# Patient Record
Sex: FEMALE | Race: WHITE
[De-identification: names, ages, dates, MRNs, and addresses within clinical notes are randomized per-mention and may not be internally consistent; named-entity substitution may affect disease eponyms.]

---

## 2017-08-14 ENCOUNTER — HOSPITAL ENCOUNTER (OUTPATIENT)
Dept: HOSPITAL 31 - C.ER | Age: 82
Setting detail: OBSERVATION
LOS: 1 days | Discharge: HOME | End: 2017-08-15
Attending: INTERNAL MEDICINE | Admitting: INTERNAL MEDICINE
Payer: MEDICARE

## 2017-08-14 VITALS — RESPIRATION RATE: 20 BRPM

## 2017-08-14 VITALS — BODY MASS INDEX: 30.9 KG/M2

## 2017-08-14 DIAGNOSIS — I10: ICD-10-CM

## 2017-08-14 DIAGNOSIS — J20.9: Primary | ICD-10-CM

## 2017-08-14 DIAGNOSIS — E78.00: ICD-10-CM

## 2017-08-14 LAB
ALBUMIN/GLOB SERPL: 1.1 {RATIO} (ref 1–2.1)
ALP SERPL-CCNC: 77 U/L (ref 38–126)
ALT SERPL-CCNC: 29 U/L (ref 9–52)
AST SERPL-CCNC: 25 U/L (ref 14–36)
BASOPHILS # BLD AUTO: 0 K/UL (ref 0–0.2)
BASOPHILS NFR BLD: 0.5 % (ref 0–2)
BILIRUB SERPL-MCNC: 0.8 MG/DL (ref 0.2–1.3)
BUN SERPL-MCNC: 12 MG/DL (ref 7–17)
CALCIUM SERPL-MCNC: 9.4 MG/DL (ref 8.6–10.4)
CHLORIDE SERPL-SCNC: 101 MMOL/L (ref 98–107)
CO2 SERPL-SCNC: 27 MMOL/L (ref 22–30)
EOSINOPHIL # BLD AUTO: 0.1 K/UL (ref 0–0.7)
EOSINOPHIL NFR BLD: 1.2 % (ref 0–4)
ERYTHROCYTE [DISTWIDTH] IN BLOOD BY AUTOMATED COUNT: 14.1 % (ref 11.5–14.5)
GLOBULIN SER-MCNC: 3.6 GM/DL (ref 2.2–3.9)
GLUCOSE SERPL-MCNC: 118 MG/DL (ref 65–105)
HCT VFR BLD CALC: 37.7 % (ref 34–47)
LYMPHOCYTES # BLD AUTO: 2.1 K/UL (ref 1–4.3)
LYMPHOCYTES NFR BLD AUTO: 24.5 % (ref 20–40)
MCH RBC QN AUTO: 26.4 PG (ref 27–31)
MCHC RBC AUTO-ENTMCNC: 31.9 G/DL (ref 33–37)
MCV RBC AUTO: 82.7 FL (ref 81–99)
MONOCYTES # BLD: 0.7 K/UL (ref 0–0.8)
MONOCYTES NFR BLD: 8.6 % (ref 0–10)
NRBC BLD AUTO-RTO: 0 % (ref 0–2)
PLATELET # BLD: 161 K/UL (ref 130–400)
PMV BLD AUTO: 7.8 FL (ref 7.2–11.7)
POTASSIUM SERPL-SCNC: 4.3 MMOL/L (ref 3.6–5.2)
PROT SERPL-MCNC: 7.8 G/DL (ref 6.3–8.3)
SODIUM SERPL-SCNC: 139 MMOL/L (ref 132–148)
WBC # BLD AUTO: 8.6 K/UL (ref 4.8–10.8)

## 2017-08-14 PROCEDURE — 94640 AIRWAY INHALATION TREATMENT: CPT

## 2017-08-14 PROCEDURE — 96368 THER/DIAG CONCURRENT INF: CPT

## 2017-08-14 PROCEDURE — 96372 THER/PROPH/DIAG INJ SC/IM: CPT

## 2017-08-14 PROCEDURE — 96365 THER/PROPH/DIAG IV INF INIT: CPT

## 2017-08-14 PROCEDURE — 87040 BLOOD CULTURE FOR BACTERIA: CPT

## 2017-08-14 PROCEDURE — 99285 EMERGENCY DEPT VISIT HI MDM: CPT

## 2017-08-14 PROCEDURE — 85025 COMPLETE CBC W/AUTO DIFF WBC: CPT

## 2017-08-14 PROCEDURE — 96367 TX/PROPH/DG ADDL SEQ IV INF: CPT

## 2017-08-14 PROCEDURE — 80053 COMPREHEN METABOLIC PANEL: CPT

## 2017-08-14 PROCEDURE — 97116 GAIT TRAINING THERAPY: CPT

## 2017-08-14 PROCEDURE — 96366 THER/PROPH/DIAG IV INF ADDON: CPT

## 2017-08-14 PROCEDURE — 71020: CPT

## 2017-08-14 PROCEDURE — 97162 PT EVAL MOD COMPLEX 30 MIN: CPT

## 2017-08-14 RX ADMIN — IPRATROPIUM BROMIDE AND ALBUTEROL SULFATE SCH ML: .5; 3 SOLUTION RESPIRATORY (INHALATION) at 14:38

## 2017-08-14 RX ADMIN — IPRATROPIUM BROMIDE AND ALBUTEROL SULFATE SCH ML: .5; 3 SOLUTION RESPIRATORY (INHALATION) at 21:24

## 2017-08-14 RX ADMIN — CLOTRIMAZOLE SCH APPLIC: 1 CREAM TOPICAL at 22:12

## 2017-08-14 RX ADMIN — CEFTRIAXONE SCH MLS/HR: 1 INJECTION, SOLUTION INTRAVENOUS at 12:00

## 2017-08-14 NOTE — CP.PCM.HP
History of Present Illness





- History of Present Illness


History of Present Illness: 





83 Y/O WITH COUGH FEVER, CI=HILL[place] FOR 3 DAYS, WEAK, SHORT OF BREATH, NO CHEST 

PAIN, NO NAUSEA





Present on Admission





- Present on Admission


Any Indicators Present on Admission: No


History of DVT/PE: No


History of Uncontrolled Diabetes: No


Urinary Catheter: No


Decubitus Ulcer Present: No





Review of Systems





- Review of Systems


Systems not reviewed;Unavailable: Respiratory Distress





- Constitutional


Constitutional: Chills





- Cardiovascular


Cardiovascular: Palpitations





- Respiratory


Respiratory: Cough, Dyspnea, Chest Congestion, Pain with Coughing





- Neurological


Neurological: Weakness





Past Patient History





- Past Medical History & Family History


Past Medical History?: Yes





- Past Social History


Smoking Status: Never Smoked





- CARDIAC


Hx Hypercholesterolemia: Yes


Hx Hypertension: Yes





- PULMONARY


Hx Asthma: Yes





- NEUROLOGICAL


Hx Neurological Disorder: No





- HEENT


Hx HEENT Problems: No





- RENAL


Hx Chronic Kidney Disease: No





- ENDOCRINE/METABOLIC


Hx Endocrine Disorders: No





- HEMATOLOGICAL/ONCOLOGICAL


Hx Blood Disorders: No





- INTEGUMENTARY


Hx Dermatological Problems: No





- MUSCULOSKELETAL/RHEUMATOLOGICAL


Hx Arthritis: Yes


Hx Falls: Yes (2 years ago, was wearing high heels)





- GASTROINTESTINAL


Hx Gastrointestinal Disorders: No





- GENITOURINARY/GYNECOLOGICAL


Hx Genitourinary Disorders: No





- PSYCHIATRIC


Hx Substance Use: No





- SURGICAL HISTORY


Hx Surgeries: Yes


Other/Comment: BREAST REDUCTION 33 YRS AGO.  MYOMECTOMY 43 YRS AGO.  Carpal 

tunnel Sx 2 years ago





- ANESTHESIA


Hx Anesthesia: Yes


Hx Anesthesia Reactions: No


Hx Malignant Hyperthermia: No


Has any member of the family had a problem w/ anesthesia?: No





Meds


Allergies/Adverse Reactions: 


 Allergies











Allergy/AdvReac Type Severity Reaction Status Date / Time


 


No Known Allergies Allergy   Verified 02/06/14 10:14














Physical Exam





- Constitutional


Appears: No Acute Distress





- Head Exam


Head Exam: ATRAUMATIC, NORMAL INSPECTION, NORMOCEPHALIC





- Eye Exam


Eye Exam: EOMI, Normal appearance, PERRL


Pupil Exam: NORMAL ACCOMODATION





- ENT Exam


ENT Exam: Mucous Membranes Moist, Normal Exam, Normal Oropharynx, TM's Normal 

Bilaterally





- Neck Exam


Neck exam: Positive for: Normal Inspection





- Respiratory Exam


Respiratory Exam: Rhonchi, NORMAL BREATHING PATTERN





- Cardiovascular Exam


Cardiovascular Exam: REGULAR RHYTHM, +S1, +S2





- GI/Abdominal Exam


GI & Abdominal Exam: Normal Bowel Sounds





- Rectal Exam


Rectal Exam: NORMAL INSPECTION





- Extremities Exam


Extremities exam: Positive for: normal inspection





- Back Exam


Back exam: NORMAL INSPECTION





- Neurological Exam


Neurological exam: Alert, CN II-XII Intact, Normal Gait, Oriented x3, Reflexes 

Normal





- Psychiatric Exam


Psychiatric exam: Normal Affect, Normal Mood





- Skin


Skin Exam: Intact





Results





- Vital Signs


Recent Vital Signs: 





 Last Vital Signs











Temp  98.3 F   08/14/17 17:13


 


Pulse  68   08/14/17 21:25


 


Resp  20   08/14/17 17:13


 


BP  152/73 H  08/14/17 17:13


 


Pulse Ox  98   08/14/17 17:13














- Labs


Result Diagrams: 


 08/14/17 09:58





 08/14/17 09:58


Labs: 





 Laboratory Results - last 24 hr











  08/14/17 08/14/17





  09:58 09:58


 


WBC  8.6 


 


RBC  4.55 


 


Hgb  12.0 


 


Hct  37.7 


 


MCV  82.7 


 


MCH  26.4 L 


 


MCHC  31.9 L 


 


RDW  14.1 


 


Plt Count  161 


 


MPV  7.8 


 


Neut % (Auto)  65.2 


 


Lymph % (Auto)  24.5 


 


Mono % (Auto)  8.6 


 


Eos % (Auto)  1.2 


 


Baso % (Auto)  0.5 


 


Neut #  5.6 


 


Lymph #  2.1 


 


Mono #  0.7 


 


Eos #  0.1 


 


Baso #  0.0 


 


Sodium   139


 


Potassium   4.3


 


Chloride   101


 


Carbon Dioxide   27


 


Anion Gap   15


 


BUN   12


 


Creatinine   0.8


 


Est GFR ( Amer)   > 60


 


Est GFR (Non-Af Amer)   > 60


 


Random Glucose   118 H


 


Calcium   9.4


 


Total Bilirubin   0.8


 


AST   25


 


ALT   29


 


Alkaline Phosphatase   77


 


Total Protein   7.8


 


Albumin   4.1


 


Globulin   3.6


 


Albumin/Globulin Ratio   1.1














Assessment & Plan


(1) Tracheobronchitis


Assessment and Plan: 


BACTERILA, R/O PNA


Status: Acute   Priority: High   





(2) Hypertension


Status: Chronic   Priority: Medium   





(3) Hyperlipidemia


Status: Chronic   Priority: Low

## 2017-08-14 NOTE — RAD
HISTORY:

cough  



COMPARISON:

No prior.



TECHNIQUE:

Chest PA and lateral



FINDINGS:

Undo 



LUNGS:

Minor localized linear scarring associated with some minor tenting 

hemidiaphragm lung base. .  There may also be some minor bibasilar 

atelectasis 



PLEURA:

No significant pleural effusion identified. No pneumothorax apparent.



CARDIOVASCULAR:

Normal.



OSSEOUS STRUCTURES:

Mild -moderate multilevel degenerative spondylosis of the thoracic 

spine. .



VISUALIZED UPPER ABDOMEN:

Normal.



OTHER FINDINGS:

None.



IMPRESSION:

Minor localized linear scarring associated with some minor tenting 

hemidiaphragm lung base. .  There may also be some minor bibasilar 

atelectasis

## 2017-08-14 NOTE — C.PDOC
History Of Present Illness


83 y/o female presents to ED with complaints of cough for 2-3 days and fever 

for 2 days, no fever today. Patient states she cannot sleep and has associated 

sob.  is at ED with similar symptoms that developed before her. Patient 

denies chest pain, nausea, vomiting, diarrhea or any other complaints at this 

time. 


Time Seen by Provider: 17 07:22


Chief Complaint (Nursing): Cough, Cold, Congestion


History Per: Patient


History/Exam Limitations: no limitations


Onset/Duration Of Symptoms: Days


Current Symptoms Are (Timing): Still Present





Past Medical History


Reviewed: Historical Data, Nursing Documentation, Vital Signs


Vital Signs: 


 Last Vital Signs











Temp  98.0 F   17 14:27


 


Pulse  77   17 14:27


 


Resp  14   17 14:27


 


BP  165/77 H  17 14:27


 


Pulse Ox  100   17 15:24














- Medical History


PMH: Arthritis, Asthma, Back Problems, HTN, Hypercholesterolemia





- [facility] Procedures








CARPAL TUNNEL RELEASE (02/06/15)








Family History: States: No Known Family Hx





- Social History


Hx Tobacco Use: No


Hx Alcohol Use: No


Hx Substance Use: No





- Immunization History


Hx Tetanus Toxoid Vaccination: No


Hx Influenza Vaccination: Yes


Hx Pneumococcal Vaccination: Yes





Review Of Systems


Except As Marked, All Systems Reviewed And Found Negative.


Constitutional: Positive for: Fever.  Negative for: Chills


Cardiovascular: Negative for: Chest Pain, Palpitations


Respiratory: Positive for: Cough, Shortness of Breath


Gastrointestinal: Negative for: Nausea, Vomiting, Diarrhea


Genitourinary: Negative for: Dysuria


Skin: Negative for: Rash





Physical Exam





- Physical Exam


Appears: Non-toxic, No Acute Distress


Skin: Normal Color, Warm, Dry, No Rash


Head: Atraumatic, Normacephalic


Oral Mucosa: Moist


Neck: Supple


Chest: Symmetrical


Cardiovascular: Rhythm Regular


Respiratory: Decreased Breath Sounds, No Rales, No Rhonchi, No Wheezing


Gastrointestinal/Abdominal: Soft, No Tenderness, No Guarding, No Rebound


Extremity: Normal ROM, Capillary Refill (<2 seconds)


Neurological/Psych: Oriented x3





ED Course And Treatment





- Laboratory Results


Result Diagrams: 


 17 09:58





 17 09:58


ECG: Interpreted By Me, Viewed By Me


ECG Interpretation: No Acute Changes


Rate From EC (BPM)


O2 Sat by Pulse Oximetry: 100 (RA)


Pulse Ox Interpretation: Normal





Medical Decision Making


Medical Decision Making: 


 Patient's  was diagnosed with Pneumonia and is being admitted. Patient  

doesn't have an obvious infiltrate on CXR, but will start Rocephin and 

Zithromax. Patient also will stay in the hospital for observation for clinic 

presentation of pneumonia, advanced age and sick contact with pneumonia  

patient.


Spoke with Dr. Oneill who accepted patient to his service for observation. 





Disposition





- Disposition


Disposition: HOSPITALIZED


Disposition Time: 09:44


Condition: FAIR





- Clinical Impression


Clinical Impression: 


 Cough, Dyspnea








- Scribe Statement


The provider has reviewed the documentation as recorded by the Scribe


Rico Alvarez





All medical record entries made by the Princessibe were at my direction and 

personally dictated by me. I have reviewed the chart and agree that the record 

accurately reflects my personal performance of the history, physical exam, 

medical decision making, and the department course for this patient. I have 

also personally directed, reviewed, and agree with the discharge instructions 

and disposition.





Decision To Admit





- Pt Status Changed To:


Hospital Disposition Of: Observation





- .


Bed Request Type: Regular


Admitting Physician: Elver Oneill


Patient Diagnosis: 


 Cough, Dyspnea

## 2017-08-15 VITALS — SYSTOLIC BLOOD PRESSURE: 135 MMHG | DIASTOLIC BLOOD PRESSURE: 75 MMHG

## 2017-08-15 VITALS — TEMPERATURE: 98.1 F

## 2017-08-15 VITALS — HEART RATE: 72 BPM | OXYGEN SATURATION: 98 %

## 2017-08-15 RX ADMIN — CEFTRIAXONE SCH MLS/HR: 1 INJECTION, SOLUTION INTRAVENOUS at 09:15

## 2017-08-15 RX ADMIN — IPRATROPIUM BROMIDE AND ALBUTEROL SULFATE SCH ML: .5; 3 SOLUTION RESPIRATORY (INHALATION) at 01:51

## 2017-08-15 RX ADMIN — IPRATROPIUM BROMIDE AND ALBUTEROL SULFATE SCH ML: .5; 3 SOLUTION RESPIRATORY (INHALATION) at 08:26

## 2017-08-15 RX ADMIN — CLOTRIMAZOLE SCH APPLIC: 1 CREAM TOPICAL at 09:31

## 2017-08-15 NOTE — CP.PCM.DIS
Provider





- Provider


Date of Admission: 


08/14/17 09:43





Attending physician: 


Elver Oneill MD





Time Spent in preparation of Discharge (in minutes): 30





Diagnosis





- Discharge Diagnosis


(1) Tracheobronchitis


Status: Acute   Priority: High   





(2) Hypertension


Status: Chronic   Priority: Medium   





(3) Hyperlipidemia


Status: Chronic   Priority: Low   





Hospital Course





- Lab Results


Lab Results: 


 Micro Results





08/14/17 10:30   Blood   Blood Culture - Preliminary


                            NO GROWTH AFTER 24 HOURS


08/14/17 09:45   Blood   Blood Culture - Preliminary


                            NO GROWTH AFTER 24 HOURS





 Most Recent Lab Values











WBC  8.6 K/uL (4.8-10.8)   08/14/17  09:58    


 


RBC  4.55 Mil/uL (3.80-5.20)   08/14/17  09:58    


 


Hgb  12.0 g/dL (11.0-16.0)   08/14/17  09:58    


 


Hct  37.7 % (34.0-47.0)   08/14/17  09:58    


 


MCV  82.7 fL (81.0-99.0)   08/14/17  09:58    


 


MCH  26.4 pg (27.0-31.0)  L  08/14/17  09:58    


 


MCHC  31.9 g/dL (33.0-37.0)  L  08/14/17  09:58    


 


RDW  14.1 % (11.5-14.5)   08/14/17  09:58    


 


Plt Count  161 K/uL (130-400)   08/14/17  09:58    


 


MPV  7.8 fL (7.2-11.7)   08/14/17  09:58    


 


Neut % (Auto)  65.2 % (50.0-75.0)   08/14/17  09:58    


 


Lymph % (Auto)  24.5 % (20.0-40.0)   08/14/17  09:58    


 


Mono % (Auto)  8.6 % (0.0-10.0)   08/14/17  09:58    


 


Eos % (Auto)  1.2 % (0.0-4.0)   08/14/17  09:58    


 


Baso % (Auto)  0.5 % (0.0-2.0)   08/14/17  09:58    


 


Neut #  5.6 K/uL (1.8-7.0)   08/14/17  09:58    


 


Lymph #  2.1 K/uL (1.0-4.3)   08/14/17  09:58    


 


Mono #  0.7 K/uL (0.0-0.8)   08/14/17  09:58    


 


Eos #  0.1 K/uL (0.0-0.7)   08/14/17  09:58    


 


Baso #  0.0 K/uL (0.0-0.2)   08/14/17  09:58    


 


Sodium  139 mmol/L (132-148)   08/14/17  09:58    


 


Potassium  4.3 mmol/L (3.6-5.2)   08/14/17  09:58    


 


Chloride  101 mmol/L ()   08/14/17  09:58    


 


Carbon Dioxide  27 mmol/L (22-30)   08/14/17  09:58    


 


Anion Gap  15  (10-20)   08/14/17  09:58    


 


BUN  12 mg/dL (7-17)   08/14/17  09:58    


 


Creatinine  0.8 MG/DL (0.7-1.2)   08/14/17  09:58    


 


Est GFR ( Amer)  > 60   08/14/17  09:58    


 


Est GFR (Non-Af Amer)  > 60   08/14/17  09:58    


 


Random Glucose  118 mg/dL ()  H  08/14/17  09:58    


 


Calcium  9.4 mg/dl (8.6-10.4)   08/14/17  09:58    


 


Total Bilirubin  0.8 mg/dL (0.2-1.3)   08/14/17  09:58    


 


AST  25 U/L (14-36)   08/14/17  09:58    


 


ALT  29 U/L (9-52)   08/14/17  09:58    


 


Alkaline Phosphatase  77 U/L ()   08/14/17  09:58    


 


Total Protein  7.8 g/dL (6.3-8.3)   08/14/17  09:58    


 


Albumin  4.1 g/dL (3.5-5.0)   08/14/17  09:58    


 


Globulin  3.6 gm/dL (2.2-3.9)   08/14/17  09:58    


 


Albumin/Globulin Ratio  1.1  (1.0-2.1)   08/14/17  09:58    














- Hospital Course


Hospital Course: 





81 y/o with cough congestion and wheezing, improved with antibiotics and she is 

for discharge





Discharge Exam





- Head Exam


Head Exam: ATRAUMATIC, NORMAL INSPECTION, NORMOCEPHALIC





- Eye Exam


Eye Exam: EOMI, Normal appearance, PERRL


Pupil Exam: NORMAL ACCOMODATION





- ENT Exam


ENT Exam: Mucous Membranes Moist, Normal Exam, Normal Oropharynx, TM's Normal 

Bilaterally





- Neck Exam


Neck exam: Normal Inspection





- Respiratory Exam


Respiratory Exam: NORMAL BREATHING PATTERN





- Cardiovascular Exam


Cardiovascular Exam: REGULAR RHYTHM, +S1, +S2





- GI/Abdominal Exam


GI & Abdominal Exam: Normal Bowel Sounds





- Rectal Exam


Rectal Exam: NORMAL INSPECTION





- Neurological Exam


Neurological exam: Alert, CN II-XII Intact, Normal Gait, Oriented x3, Reflexes 

Normal





- Psychiatric Exam


Psychiatric exam: Normal Affect, Normal Mood





- Skin


Skin Exam: Intact





Discharge Plan





- Discharge Medications


Prescriptions: 


Amoxicillin [Amoxil 500 mg Cap] 500 mg PO Q8 #15 cap


guaiFENesin [guaifENESIN] 100 mg PO Q8 PRN #120 udc


 PRN Reason: Cough And Congestion





- Follow Up Plan


Condition: FAIR


Disposition: HOME/ ROUTINE


Instructions:  Guaifenesin (By mouth), Amoxicillin/Clavulanate Potassium (By 

mouth), Dyspnea (GEN), Acute Cough (GEN), How Your Lungs Work (DC)


Additional Instructions: 


f/u with PMD in 1 week 


Continue medication as per Med. Rec.


Referrals: 


Elver Oneill MD [Staff Provider] -

## 2017-08-18 NOTE — CARD
--------------- APPROVED REPORT --------------





EKG Measurement

Heart [id number]

MS 142P-8

[id number]

[id number]

[id number]



<Conclusion>

Normal sinus rhythm

Normal ECG

## 2018-04-05 ENCOUNTER — HOSPITAL ENCOUNTER (EMERGENCY)
Dept: HOSPITAL 31 - C.ER | Age: 83
Discharge: HOME | End: 2018-04-05
Payer: MEDICARE

## 2018-04-05 VITALS
SYSTOLIC BLOOD PRESSURE: 180 MMHG | RESPIRATION RATE: 18 BRPM | DIASTOLIC BLOOD PRESSURE: 81 MMHG | OXYGEN SATURATION: 98 % | TEMPERATURE: 97.9 F | HEART RATE: 68 BPM

## 2018-04-05 VITALS — BODY MASS INDEX: 30.9 KG/M2

## 2018-04-05 DIAGNOSIS — K62.89: Primary | ICD-10-CM

## 2018-04-05 NOTE — C.PDOC
History Of Present Illness


Pt c/o anal/rectal pain. 


Time Seen by Provider: 04/05/18 14:04


Chief Complaint (Nursing): GI Problem


History Per: Patient


Onset/Duration Of Symptoms: Days, Waxing/Waning


Current Symptoms Are (Timing): Still Present


Severity: Moderate


Location Of Pain/Discomfort: Other (rectum/anal)


Quality Of Discomfort: Burning, "Pain"


Additional History Per: Prior Records


Abnormal Vaginal Bleeding: No





Past Medical History


Reviewed: Historical Data, Nursing Documentation, Vital Signs


Vital Signs: 





 Last Vital Signs











Temp  97.9 F   04/05/18 13:50


 


Pulse  68   04/05/18 13:50


 


Resp  18   04/05/18 13:50


 


BP  180/81 H  04/05/18 13:50


 


Pulse Ox  98   04/05/18 13:50














- Medical History


PMH: Arthritis (SH, KNEES), Asthma, Back Problems, COPD (ASTHMA), HTN, 

Hypercholesterolemia





- [facility] Procedures











CARPAL TUNNEL RELEASE (02/06/15)








Family History: States: Unknown Family Hx





- Social History


Hx Tobacco Use: No


Hx Alcohol Use: No


Hx Substance Use: No





- Immunization History


Hx Tetanus Toxoid Vaccination: No


Hx Influenza Vaccination: Yes


Hx Pneumococcal Vaccination: Yes





Review Of Systems


Except As Marked, All Systems Reviewed And Found Negative.


Constitutional: Negative for: Fever


Cardiovascular: Negative for: Chest Pain


Respiratory: Negative for: Shortness of Breath


Gastrointestinal: Positive for: Rectal Pain.  Negative for: Vomiting, Abdominal 

Pain, Melena, Hematochezia, Hematemesis


Genitourinary: Negative for: Dysuria, Vaginal Bleeding


Musculoskeletal: Negative for: Neck Pain, Back Pain


Skin: Negative for: Rash


Neurological: Negative for: Weakness, Numbness





Physical Exam





- Physical Exam


Appears: Non-toxic, No Acute Distress


Skin: Normal Color, Warm, Dry, No Rash


Head: Atraumatic, Normacephalic


Eye(s): bilateral: Normal Inspection, PERRL, EOMI


Neck: Normal ROM, Supple


Cardiovascular: Rhythm Regular


Respiratory: Normal Breath Sounds, No Accessory Muscle Use


Gastrointestinal/Abdominal: Soft, No Tenderness


Rectal: Rectal Tone (wnl), Hemorrhoids, No Mass, Tenderness, Other (Brown stool)


Back: No CVA Tenderness


Extremity: Normal ROM


Neurological/Psych: Oriented x3, Normal Motor, Normal Sensation





ED Course And Treatment


O2 Sat by Pulse Oximetry: 98


Pulse Ox Interpretation: Normal





Disposition


Counseled Patient/Family Regarding: Diagnosis, Need For Followup, Rx Given





- Disposition


Referrals: 


Ritchie Araujo MD [Non-Staff] - 


Disposition: HOME/ ROUTINE


Disposition Time: 14:28


Condition: STABLE


Additional Instructions: 


Follow up with your doctor for further evaluation and treatment. Return to the 

ER if you develop fever, abdominal pain, vomiting, bloody stools, worsening of 

symptoms or if you have any other concerns. 


Prescriptions: 


Docusate [Colace] 100 mg PO BID PRN #60 cap


 PRN Reason: Constipation


Phenylephrine [Amaya-Med NF] 1 supp RC QID PRN #30 sup


 PRN Reason: Hemorrhoids


Instructions:  Hemorrhoids (DC)


Forms:  [employer] (Uzbek)


Print Language: Senegalese





- Clinical Impression


Clinical Impression: 


 Rectal or anal pain

## 2018-08-12 ENCOUNTER — HOSPITAL ENCOUNTER (EMERGENCY)
Dept: HOSPITAL 31 - C.ER | Age: 83
Discharge: HOME | End: 2018-08-12
Payer: MEDICARE

## 2018-08-12 VITALS
SYSTOLIC BLOOD PRESSURE: 168 MMHG | TEMPERATURE: 97.9 F | RESPIRATION RATE: 19 BRPM | HEART RATE: 62 BPM | DIASTOLIC BLOOD PRESSURE: 84 MMHG

## 2018-08-12 VITALS — OXYGEN SATURATION: 99 %

## 2018-08-12 VITALS — BODY MASS INDEX: 31.7 KG/M2

## 2018-08-12 DIAGNOSIS — I10: ICD-10-CM

## 2018-08-12 DIAGNOSIS — M79.661: Primary | ICD-10-CM

## 2018-08-12 DIAGNOSIS — E78.00: ICD-10-CM

## 2018-08-12 NOTE — RAD
Date of service: 



08/12/2018



PROCEDURE:  Radiographs of the right tibia and fibula.



HISTORY:

pain



COMPARISON:

None available



TECHNIQUE:

Frontal and lateral views obtained.



FINDINGS:



BONES:

No fracture or destructive lesion. Ossicle within the distal patellar 

tendon in the region of the tibial tuberosity.



JOINT SPACES:

Unremarkable.



OTHER FINDINGS:

None.



IMPRESSION:

Unremarkable radiographs of the right tibia and fibula.

## 2018-08-12 NOTE — C.PDOC
History Of Present Illness


83-year-old female, with PMH arthritis and back pain presents to the emergency 

department with complaints of right lower leg pain, ongoing for the past month. 

Pain worsens with ambulating, and improves with rest. Patient denies any 

changes in sensation, trauma, swelling, change in temperature or any other 

associated symptoms. Patient has not seen PMD for this pain. 


Time Seen by Provider: 08/12/18 14:33


Chief Complaint (Nursing): Lower Extremity Problem/Injury


History Per: Patient


History/Exam Limitations: no limitations





Past Medical History


Reviewed: Historical Data, Nursing Documentation, Vital Signs


Vital Signs: 


 Last Vital Signs











Temp  97.9 F   08/12/18 16:48


 


Pulse  62   08/12/18 16:48


 


Resp  19   08/12/18 16:48


 


BP  168/84 H  08/12/18 16:48


 


Pulse Ox  99   08/12/18 18:55














- Medical History


PMH: Arthritis (SH, KNEES), Asthma, Back Problems, COPD (ASTHMA), HTN, 

Hypercholesterolemia





- [facility] Procedures








CARPAL TUNNEL RELEASE (02/06/15)








Family History: States: No Known Family Hx





- Social History


Hx Tobacco Use: No


Hx Alcohol Use: No


Hx Substance Use: No





- Immunization History


Hx Tetanus Toxoid Vaccination: No


Hx Influenza Vaccination: Yes


Hx Pneumococcal Vaccination: Yes





Review Of Systems


Constitutional: Negative for: Fever


Respiratory: Negative for: Shortness of Breath


Gastrointestinal: Negative for: Nausea, Vomiting


Musculoskeletal: Positive for: Leg Pain.  Negative for: Back Pain


Neurological: Negative for: Weakness, Numbness





Physical Exam





- Physical Exam


Appears: Non-toxic, No Acute Distress


Skin: Normal Color, Warm, Dry, No Rash


Head: Atraumatic, Normacephalic


Eye(s): bilateral: Normal Inspection, EOMI


Nose: Normal


Oral Mucosa: Moist


Lips: Normal Appearing


Neck: Normal ROM, Supple


Chest: Symmetrical


Cardiovascular: Rhythm Regular


Respiratory: Normal Breath Sounds, No Accessory Muscle Use


Extremity: Normal ROM, Capillary Refill (<2 sec), No Deformity, No Swelling, 

Other (Right lower extremity: tenderness to lateral aspect)


Extremity: Bilateral: Normal Color And Temperature, Normal ROM


Pulses: Left Dorsalis Pedis: Normal, Right Dorsalis Pedis: Normal


Neurological/Psych: Oriented x3, Normal Speech, Normal Motor, Normal Sensation


Gait: Steady





ED Course And Treatment


O2 Sat by Pulse Oximetry: 99


Pulse Ox Interpretation: Normal (RA)


Progress Note: XR ordered and reviewed as negative. Pts doppler is negative for 

DVT. She was treated with naprosyn, with relief of pain. Pt was instructed to 

follow up with PMD in 1-2 days. Return to ER if symptoms persist or worsen.





Disposition





- Disposition


Disposition: HOME/ ROUTINE


Disposition Time: 16:08


Condition: STABLE


Additional Instructions: 


Follow up with your doctor in 1-2 days. Return to ER if symptoms persist or 

worsen. 


Prescriptions: 


Naproxen [Naprosyn] 1 tab PO BID PRN #20 tab


 PRN Reason: Pain


Instructions:  Chronic Knee Pain (DC)


Forms:  [facility] (English)





- Clinical Impression


Clinical Impression: 


 Right leg pain








- Scribe Statement


The provider has reviewed the documentation as recorded by the Scribe (Tenzin Navas)








All medical record entries made by the Scribe were at my direction and 

personally dictated by me. I have reviewed the chart and agree that the record 

accurately reflects my personal performance of the history, physical exam, 

medical decision making, and the department course for this patient. I have 

also personally directed, reviewed, and agree with the discharge instructions 

and disposition.

## 2019-04-01 NOTE — RAD
Date of service: 



04/01/2019



PROCEDURE:  CHEST RADIOGRAPH, 1 VIEW



HISTORY:

SOB



COMPARISON:

08/07/2018.



FINDINGS:



LUNGS:





The lungs are well inflated and clear.



PLEURA:

No pneumothorax or pleural effusion.



CARDIOVASCULAR:

There is mild cardiomegaly.  No aortic atherosclerotic calcifications 

present. 



OSSEOUS STRUCTURES:

Within normal limits for the patient's age.



VISUALIZED UPPER ABDOMEN:

Normal.



OTHER FINDINGS:

None. 



IMPRESSION:

No active pulmonary disease.

## 2019-04-01 NOTE — CT
Date of service: 



04/01/2019



PROCEDURE:  CT Cervical Spine without contrast



HISTORY:

fall



COMPARISON:

None available.



TECHNIQUE:

Axial computed tomography images were obtained of the cervical spine 

without the use of intravenous contrast. Coronal and sagittal 

reformatted images were created and reviewed.



Radiation dose:



Total exam DLP = 475.98 mGy-cm.



This CT exam was performed using one or more of the following dose 

reduction techniques: Automated exposure control, adjustment of the 

mA and/or kV according to patient size, and/or use of iterative 

reconstruction technique.



FINDINGS:



VERTEBRAE:

There is normal alignment of the cervical vertebral bodies.  There is 

straightening of the cervical spine with loss of cervical lordosis.  

There is no acute fracture or traumatic anterior listhesis.



The craniocervical junction is normal.  There is severe degenerative 

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



DISCS/SPINAL CANAL/NEURAL FORAMINA:

There is multilevel degenerative disc disease due to combination of 

disc osteophyte complexes, uncovertebral joint hypertrophy and 

multilevel facet arthropathy, worse at C5-6 with broad-based disc 

osteophyte complex, asymmetric left uncovertebral joint hypertrophy 

and mild left facet arthropathy which result in moderate spinal canal 

stenosis and severe left neural foraminal narrowing. 



PARASPINAL SOFT TISSUES:

The paraspinous soft tissues are normal.  No prevertebral soft tissue 

thickening.



OTHER FINDINGS:

No fecal pneumothorax.



IMPRESSION:

1. No acute fracture or traumatic anterior listhesis.



2. Straightening of the cervical spine may be positional or related 

to muscle spasm. 



3. Multilevel degenerative disc disease, worse at C5-6 with moderate 

spinal canal stenosis and severe left neural foraminal narrowing.

## 2019-04-01 NOTE — CT
Date of service: 04/01/2019



PROCEDURE:  CT HEAD WITHOUT CONTRAST.



HISTORY:

syncope



COMPARISON:

Noncontrast head CT performed 3/1/14



TECHNIQUE:

Axial computed tomography images were obtained through the head/brain 

without intravenous contrast.  



Radiation dose:



Total exam DLP = 2152.82 mGy-cm.



This CT exam was performed using one or more of the following dose 

reduction techniques: Automated exposure control, adjustment of the 

mA and/or kV according to patient size, and/or use of iterative 

reconstruction technique.



FINDINGS:



HEMORRHAGE:

No intracranial hemorrhage. 



BRAIN:

Diffuse atrophy with prominence of the ventricles and sulci noted. 



No mass effect or edema. Intracranial atherosclerosis. 



Scattered periventricular and subcortical white matter hypodensities, 

which are nonspecific, but often seen with chronic microvascular 

ischemic disease. 



Please note that MRI with diffusion imaging is more sensitive in the 

detection of acute ischemic event.



VENTRICLES:

No hydrocephalus. 



CALVARIUM:

Unremarkable.



PARANASAL SINUSES:

Unremarkable as visualized. No significant inflammatory changes.



MASTOID AIR CELLS:

Unremarkable as visualized. No inflammatory changes.



OTHER FINDINGS:

None.



IMPRESSION:

No acute intracranial pathology identified.

## 2019-04-01 NOTE — C.PDOC
Time Seen by Provider: 04/01/19 14:27


Chief Complaint (Nursing): Syncope





Past Medical History


Vital Signs: 





                                Last Vital Signs











Temp  97.9 F   04/01/19 13:27


 


Pulse  60   04/01/19 16:09


 


Resp  18   04/01/19 16:09


 


BP  154/68 H  04/01/19 16:09


 


Pulse Ox  97   04/01/19 16:09














- Medical History


PMH: Arthritis (SH, KNEES), Asthma, Back Problems, COPD (ASTHMA), HTN, 

Hypercholesterolemia


   Denies: Chronic Kidney Disease





- [facility] Procedures











CARPAL TUNNEL RELEASE (02/06/15)








Family History: States: Unknown Family Hx





- Social History


Hx Tobacco Use: No


Hx Alcohol Use: No


Hx Substance Use: No





- Immunization History


Hx Tetanus Toxoid Vaccination: No


Hx Influenza Vaccination: Yes


Hx Pneumococcal Vaccination: Yes





ED Course And Treatment





- Laboratory Results


Result Diagrams: 


                                 04/01/19 15:07





                                 04/01/19 15:07


Lab Results: 





                                        











PT  11.7 SECONDS (9.7-12.2)   04/01/19  15:07    


 


INR  1.1   04/01/19  15:07    


 


APTT  36 SECONDS (21-34)  H  04/01/19  15:07    








                                        











Troponin I  < 0.0120 ng/mL (0.00-0.120)   04/01/19  15:07    








                                        











NT-Pro-B Natriuret Pep  79.4 pg/mL (0-900)   04/01/19  15:07    








                                        











Total Bilirubin  1.2 mg/dL (0.2-1.3)   04/01/19  15:07    


 


AST  68 U/L (14-36)  H  04/01/19  15:07    


 


ALT  17 U/L (9-52)   04/01/19  15:07    


 


Alkaline Phosphatase  57 U/L ()   04/01/19  15:07    


 


Total Protein  8.3 g/dL (6.3-8.3)   04/01/19  15:07    


 


Albumin  4.8 g/dL (3.5-5.0)   04/01/19  15:07    


 


Globulin  3.5 gm/dL (2.2-3.9)   04/01/19  15:07    


 


Albumin/Globulin Ratio  1.3  (1.0-2.1)   04/01/19  15:07    











O2 Sat by Pulse Oximetry: 97





Disposition


Counseled Patient/Family Regarding: Studies Performed, Diagnosis, Need For 

Followup





- Disposition


Referrals: 


Ritchie Brennan MD [Non-Staff] - 


Disposition: HOME/ ROUTINE


Disposition Time: 17:05


Condition: STABLE


Additional Instructions: 


follow up with your doctor within 2 days


call to make an appointment


take pain medication as needed


return to ER if symptoms worsens or progress 


Prescriptions: 


Naproxen [Naprosyn] 500 mg PO BID PRN #16 tab


 PRN Reason: Pain, Moderate (4-7)


Instructions:  Minor Head Injury (DC), Cervical Muscle Strain (DC), Preventing 

Falls in the Older Adult


Forms:  [facility] (English), General Discharge Instructions





- Clinical Impression


Clinical Impression: 


 Head injury, Fall, Cervical strain

## 2019-04-01 NOTE — C.PDOC
History Of Present Illness


83 year old female presents to ED with complaint of pain to the left side of her

neck and head. Patient states that she slipped and fell 2 days ago onto to her 

left side. Patient also complains of pain to the back of her head. She denies 

loss of consciousness, chest pain, and SOB. 





Time Seen by Provider: 19 14:27


Chief Complaint (Nursing): Syncope


History Per: Patient


History/Exam Limitations: no limitations


Onset/Duration Of Symptoms: Days (2)


Current Symptoms Are (Timing): Still Present





Past Medical History


Reviewed: Historical Data, Nursing Documentation, Vital Signs


Vital Signs: 





                                Last Vital Signs











Temp  97.9 F   19 13:27


 


Pulse  60   19 16:09


 


Resp  18   19 16:09


 


BP  154/68 H  19 16:09


 


Pulse Ox  97   19 16:09














- Medical History


PMH: Arthritis (SH, KNEES), Asthma, Back Problems, COPD (ASTHMA), HTN, 

Hypercholesterolemia


   Denies: Chronic Kidney Disease


Surgical History: No Surg Hx





- [facility] Procedures











CARPAL TUNNEL RELEASE (02/06/15)








Family History: States: Unknown Family Hx





- Social History


Hx Tobacco Use: No


Hx Alcohol Use: No


Hx Substance Use: No





- Immunization History


Hx Tetanus Toxoid Vaccination: No


Hx Influenza Vaccination: Yes


Hx Pneumococcal Vaccination: Yes





Review Of Systems


Except As Marked, All Systems Reviewed And Found Negative.


Musculoskeletal: Positive for: Neck Pain


Neurological: Positive for: Headache (head pain)





Physical Exam





- Physical Exam


Appears: Well, Non-toxic, No Acute Distress


Skin: Normal Color, Warm, Dry


Head: Atraumatic, Tenderness (occipital region of the scalp)


Eye(s): bilateral: Normal Inspection, PERRL, EOMI


Nose: Normal


Oral Mucosa: Moist


Neck: No Midline Cervical Tenderness, Paracervical Tenderness


Chest: Symmetrical


Cardiovascular: Rhythm Regular, No Murmur


Respiratory: No Accessory Muscle Use, No Rales, No Rhonchi, No Wheezing


Gastrointestinal/Abdominal: Normal Exam, Soft, No Tenderness


Extremity: Bilateral: Atraumatic, Normal Color And Temperature, Normal ROM


Neurological/Psych: Oriented x3, Normal Speech, Normal Cognition





ED Course And Treatment





- Laboratory Results


Result Diagrams: 


                                 19 15:07





                                 19 15:07


Lab Results: 





                                        











PT  11.7 SECONDS (9.7-12.2)   19  15:07    


 


INR  1.1   19  15:07    


 


APTT  36 SECONDS (21-34)  H  19  15:07    








                                        











Troponin I  < 0.0120 ng/mL (0.00-0.120)   19  15:07    








                                        











NT-Pro-B Natriuret Pep  79.4 pg/mL (0-900)   19  15:07    








                                        











Total Bilirubin  1.2 mg/dL (0.2-1.3)   19  15:07    


 


AST  68 U/L (14-36)  H  19  15:07    


 


ALT  17 U/L (9-52)   19  15:07    


 


Alkaline Phosphatase  57 U/L ()   19  15:07    


 


Total Protein  8.3 g/dL (6.3-8.3)   19  15:07    


 


Albumin  4.8 g/dL (3.5-5.0)   19  15:07    


 


Globulin  3.5 gm/dL (2.2-3.9)   19  15:07    


 


Albumin/Globulin Ratio  1.3  (1.0-2.1)   19  15:07    











ECG: Interpreted By Me, Viewed By Me


ECG Rhythm: Sinus Rhythm, PVC (occasional )


ECG Interpretation: Normal


Interpretation Of ECG: Normal interval. Normal axis. No ST/T wave abnormalities.


Rate From EC


O2 Sat by Pulse Oximetry: 97 (in RA)





- Other Rad


  ** CXR


X-Ray: Interpreted by Me, Viewed By Me


Interpretation: IMPRESSION:  No active pulmonary disease.





- CT Scan/US


  ** C-spine CT


Other Rad Studies (CT/US): Interpreted By Me, Read By Radiologist


CT/US Interpretation: IMPRESSION:  1. No acute fracture or traumatic anterior 

listhesis.  2. Straightening of the cervical spine may be positional or related 

to muscle spasm.  3. Multilevel degenerative disc disease, worse at C5-6 with 

moderate spinal canal stenosis and severe left neural foraminal narrowing.





  ** Head CT


Other Rad Studies (CT/US): Interpreted By Me, Read By Radiologist


CT/US Interpretation: IMPRESSION:  No acute intracranial pathology identified.





Medical Decision Making


Medical Decision Making: 


Assessment: minor head injury; cervical strain 





Plan:


C-spine CT


Head CT


EKG


CXR


Labs ordered with troponin,CBC, and UA








Disposition





- Disposition


Referrals: 


Ritchie Brennan MD [Non-Staff] - 


Disposition: HOME/ ROUTINE


Disposition Time: 17:05


Condition: STABLE


Additional Instructions: 


follow up with your doctor within 2 days


call to make an appointment


take pain medication as needed


return to ER if symptoms worsens or progress 


Prescriptions: 


Naproxen [Naprosyn] 500 mg PO BID PRN #16 tab


 PRN Reason: Pain, Moderate (4-7)


Instructions:  Preventing Falls in the Older Adult, Minor Head Injury (DC), 

Cervical Muscle Strain (DC)


Forms:  General Discharge Instructions, [facility] (English)





- Clinical Impression


Clinical Impression: 


 Head injury, Fall, Cervical strain








- Scribe Statement


The provider has reviewed the documentation as recorded by the Scribe (Nette Garcia)








All medical record entries made by the Scribe were at my direction and 

personally dictated by me. I have reviewed the chart and agree that the record 

accurately reflects my personal performance of the history, physical exam, 

medical decision making, and the department course for this patient. I have also

 personally directed, reviewed, and agree with the discharge instructions and 

disposition.

## 2019-04-02 NOTE — CARD
--------------- APPROVED REPORT --------------





Date of service: 04/01/2019



EKG Measurement

Heart [id number]

TN [id number]

[id number]

[id number]

[id number]



<Conclusion>

Sinus rhythm with occasional premature ventricular complexes

Otherwise normal ECG